# Patient Record
Sex: FEMALE | Race: BLACK OR AFRICAN AMERICAN | NOT HISPANIC OR LATINO | Employment: FULL TIME | URBAN - METROPOLITAN AREA
[De-identification: names, ages, dates, MRNs, and addresses within clinical notes are randomized per-mention and may not be internally consistent; named-entity substitution may affect disease eponyms.]

---

## 2022-06-29 ENCOUNTER — OFFICE VISIT (OUTPATIENT)
Dept: PRIMARY CARE CLINIC | Facility: CLINIC | Age: 62
End: 2022-06-29
Payer: COMMERCIAL

## 2022-06-29 VITALS
SYSTOLIC BLOOD PRESSURE: 120 MMHG | OXYGEN SATURATION: 99 % | DIASTOLIC BLOOD PRESSURE: 80 MMHG | RESPIRATION RATE: 20 BRPM | WEIGHT: 205 LBS | HEIGHT: 68 IN | HEART RATE: 69 BPM | BODY MASS INDEX: 31.07 KG/M2

## 2022-06-29 DIAGNOSIS — Z76.89 ENCOUNTER TO ESTABLISH CARE WITH NEW DOCTOR: ICD-10-CM

## 2022-06-29 DIAGNOSIS — F17.200 NEEDS SMOKING CESSATION EDUCATION: ICD-10-CM

## 2022-06-29 DIAGNOSIS — Z01.818 PRE-OP EVALUATION: Primary | ICD-10-CM

## 2022-06-29 PROCEDURE — 3008F PR BODY MASS INDEX (BMI) DOCUMENTED: ICD-10-PCS | Mod: CPTII,,, | Performed by: STUDENT IN AN ORGANIZED HEALTH CARE EDUCATION/TRAINING PROGRAM

## 2022-06-29 PROCEDURE — 3079F PR MOST RECENT DIASTOLIC BLOOD PRESSURE 80-89 MM HG: ICD-10-PCS | Mod: CPTII,,, | Performed by: STUDENT IN AN ORGANIZED HEALTH CARE EDUCATION/TRAINING PROGRAM

## 2022-06-29 PROCEDURE — 3074F SYST BP LT 130 MM HG: CPT | Mod: CPTII,,, | Performed by: STUDENT IN AN ORGANIZED HEALTH CARE EDUCATION/TRAINING PROGRAM

## 2022-06-29 PROCEDURE — 1159F MED LIST DOCD IN RCRD: CPT | Mod: CPTII,,, | Performed by: STUDENT IN AN ORGANIZED HEALTH CARE EDUCATION/TRAINING PROGRAM

## 2022-06-29 PROCEDURE — 3079F DIAST BP 80-89 MM HG: CPT | Mod: CPTII,,, | Performed by: STUDENT IN AN ORGANIZED HEALTH CARE EDUCATION/TRAINING PROGRAM

## 2022-06-29 PROCEDURE — 3074F PR MOST RECENT SYSTOLIC BLOOD PRESSURE < 130 MM HG: ICD-10-PCS | Mod: CPTII,,, | Performed by: STUDENT IN AN ORGANIZED HEALTH CARE EDUCATION/TRAINING PROGRAM

## 2022-06-29 PROCEDURE — 1160F PR REVIEW ALL MEDS BY PRESCRIBER/CLIN PHARMACIST DOCUMENTED: ICD-10-PCS | Mod: CPTII,,, | Performed by: STUDENT IN AN ORGANIZED HEALTH CARE EDUCATION/TRAINING PROGRAM

## 2022-06-29 PROCEDURE — 1160F RVW MEDS BY RX/DR IN RCRD: CPT | Mod: CPTII,,, | Performed by: STUDENT IN AN ORGANIZED HEALTH CARE EDUCATION/TRAINING PROGRAM

## 2022-06-29 PROCEDURE — 1159F PR MEDICATION LIST DOCUMENTED IN MEDICAL RECORD: ICD-10-PCS | Mod: CPTII,,, | Performed by: STUDENT IN AN ORGANIZED HEALTH CARE EDUCATION/TRAINING PROGRAM

## 2022-06-29 PROCEDURE — 99204 PR OFFICE/OUTPT VISIT, NEW, LEVL IV, 45-59 MIN: ICD-10-PCS | Mod: ,,, | Performed by: STUDENT IN AN ORGANIZED HEALTH CARE EDUCATION/TRAINING PROGRAM

## 2022-06-29 PROCEDURE — 3008F BODY MASS INDEX DOCD: CPT | Mod: CPTII,,, | Performed by: STUDENT IN AN ORGANIZED HEALTH CARE EDUCATION/TRAINING PROGRAM

## 2022-06-29 PROCEDURE — 99204 OFFICE O/P NEW MOD 45 MIN: CPT | Mod: ,,, | Performed by: STUDENT IN AN ORGANIZED HEALTH CARE EDUCATION/TRAINING PROGRAM

## 2022-06-29 RX ORDER — MOXIFLOXACIN 5 MG/ML
1 SOLUTION/ DROPS OPHTHALMIC 4 TIMES DAILY
COMMUNITY
Start: 2022-06-08

## 2022-06-29 RX ORDER — DORZOLAMIDE HCL 20 MG/ML
1 SOLUTION/ DROPS OPHTHALMIC 2 TIMES DAILY
COMMUNITY
Start: 2022-06-08

## 2022-06-29 RX ORDER — ACETAZOLAMIDE 250 MG/1
250 TABLET ORAL 2 TIMES DAILY
COMMUNITY
Start: 2022-06-08

## 2022-06-29 RX ORDER — PREDNISOLONE ACETATE 10 MG/ML
1 SUSPENSION/ DROPS OPHTHALMIC 4 TIMES DAILY
COMMUNITY
Start: 2022-06-08

## 2022-06-29 NOTE — ASSESSMENT & PLAN NOTE
Unfortunately, patient presents just 1 week before her scheduled cataract surgery. She has not had a PCP in many years. She has been informed that pre-op paperwork will not be signed until lab results are received and that this visit does mean I have cleared her for surgery.  I will addend this note once I have lab results to make a final determination on surgery clearance

## 2022-06-29 NOTE — PROGRESS NOTES
"Subjective:       Patient ID: Arlet Heaton is a 61 y.o. female.    Past Medical History:  Anxiety  Cataract  Smoker     Chief Complaint: Establish Care, pre-op visit for surgical clearance " cataract surgery" (Dr Viri Jennings), never had colonoscopy, and Dr Castaneda - ob/gyn  (Yrs since last pap and last mammogram /Never had shingles )    Presents to establish care. She is requesting pre-op clearance for a cataracts procedure to take place in 1 week. She has not had a PCP in many years, uses urgent cares as needed. No labs visible in our system. She has no complaints. Denies any significant medical history. No surgical history. Family history of stroke in multiple family members. Smokes cigarettes occasionally.         Review of Systems   Constitutional: Negative for chills, fatigue, fever and unexpected weight change.   HENT: Negative for nasal congestion, ear pain, hearing loss, sinus pressure/congestion and sore throat.    Eyes: Negative for pain.   Respiratory: Negative for cough, shortness of breath and wheezing.    Cardiovascular: Negative for chest pain, palpitations and leg swelling.   Gastrointestinal: Negative for abdominal pain, diarrhea, nausea and vomiting.   Endocrine: Negative for polydipsia and polyuria.   Genitourinary: Negative for dysuria, frequency, hematuria and urgency.   Musculoskeletal: Negative for arthralgias and myalgias.   Integumentary:  Negative for color change, rash and wound.   Neurological: Negative for dizziness, syncope, weakness, light-headedness and headaches.   Hematological: Negative for adenopathy. Does not bruise/bleed easily.   Psychiatric/Behavioral: Negative for confusion. The patient is not nervous/anxious.            Objective:      Physical Exam  Vitals and nursing note reviewed.   Constitutional:       General: She is not in acute distress.     Appearance: She is not ill-appearing.   HENT:      Nose: No congestion or rhinorrhea.      Mouth/Throat:      Mouth: Mucous " membranes are moist.      Pharynx: No oropharyngeal exudate or posterior oropharyngeal erythema.   Eyes:      Extraocular Movements: Extraocular movements intact.      Conjunctiva/sclera: Conjunctivae normal.      Pupils: Pupils are equal, round, and reactive to light.   Cardiovascular:      Rate and Rhythm: Normal rate and regular rhythm.      Pulses: Normal pulses.      Heart sounds: No murmur heard.  Pulmonary:      Effort: Pulmonary effort is normal.      Breath sounds: Normal breath sounds.   Abdominal:      Palpations: Abdomen is soft. There is no mass.      Tenderness: There is no abdominal tenderness.   Musculoskeletal:         General: No deformity or signs of injury.      Right lower leg: No edema.      Left lower leg: No edema.   Lymphadenopathy:      Cervical: No cervical adenopathy.   Skin:     General: Skin is warm and dry.      Findings: No rash.   Neurological:      General: No focal deficit present.      Mental Status: She is alert. Mental status is at baseline.   Psychiatric:         Mood and Affect: Mood normal.         Behavior: Behavior normal.           Assessment & Plan:   1. Pre-op evaluation  Assessment & Plan:  Unfortunately, patient presents just 1 week before her scheduled cataract surgery. She has not had a PCP in many years. She has been informed that pre-op paperwork will not be signed until lab results are received and that this visit does mean I have cleared her for surgery.  I will addend this note once I have lab results to make a final determination on surgery clearance    Orders:  -     CBC Auto Differential  -     Comprehensive Metabolic Panel    2. BMI 31.0-31.9,adult  -     Hemoglobin A1C  -     Lipid Panel    3. Encounter to establish care with new doctor  Comments:  reviewed medical history  ordered labs in peperation for wellness visit in 1 month  Orders:  -     CBC Auto Differential  -     Comprehensive Metabolic Panel  -     Hemoglobin A1C  -     Lipid Panel  -     TSH  -      Urinalysis      Addendum:  Reviewed lab results. Only significant abnormality is A1c of 6.1%, prediabetes. Otherwise nothing to hold us back from proceeding with surgery.  Her Revised Cardiac Risk Index score is 0  She is capable of at least 4 METS  No chronic medical conditions noted  Overall, I would consider this patient to be low risk for a low risk procedure like cataract surgery.

## 2022-06-30 DIAGNOSIS — Z01.818 PRE-OP EVALUATION: Primary | ICD-10-CM

## 2022-07-05 ENCOUNTER — TELEPHONE (OUTPATIENT)
Dept: PRIMARY CARE CLINIC | Facility: CLINIC | Age: 62
End: 2022-07-05
Payer: COMMERCIAL

## 2022-07-05 NOTE — TELEPHONE ENCOUNTER
Spoke with patient , instructed on lab review , patient voices understanding ; patient has an appt @ 245pm today for her surgical clearance with eye doctor , will fax papers to office , unless otherwise they tell patient they need original forms , patient will let me know .

## 2024-01-22 ENCOUNTER — PATIENT OUTREACH (OUTPATIENT)
Dept: ADMINISTRATIVE | Facility: HOSPITAL | Age: 64
End: 2024-01-22
Payer: COMMERCIAL

## 2024-01-22 NOTE — PROGRESS NOTES
Population Health. Out Reach. Reviewing patient's chart for quality metrics.I attempted to contact patient to see if she has had a recent breast cancer screening, pap, colorectal and schedule pcp appt. no answer

## 2024-02-15 ENCOUNTER — PATIENT OUTREACH (OUTPATIENT)
Dept: ADMINISTRATIVE | Facility: HOSPITAL | Age: 64
End: 2024-02-15
Payer: COMMERCIAL

## 2024-02-15 NOTE — PROGRESS NOTES
Population Health. Out Reach. Reviewing patient's chart for quality metrics.attempt pt outreach re: pcp appt, colorectal cancer screening, mmg, pap, no answer